# Patient Record
(demographics unavailable — no encounter records)

---

## 2024-10-25 NOTE — PHYSICAL EXAM

## 2024-10-25 NOTE — HISTORY OF PRESENT ILLNESS
[FreeTextEntry1] : Problems: 1. DM type 2  Note - patient has prostate cancer   DM type 2 1. Diagnosed in 2003 2. Meds: Insulin levemir pens 7 units sc bid Insulin humalog pens 7 units sc tid with meals - patient mainly uses this two times per day with breakfast and dinner - patient eats lunch but be does not use the insulin humalog as he eats a small amount with lunch.  Metformin 1000 mg po bid (no side effects) Ozempic 0.5 mg sc once weekly - no side effects - No pancreatitis, no personal or family history pancreatitis PATIENT HAD DIFFICULTY PAYING FOR [email or web address] Patient does not want to use actos due to the potential side effects. Patient has genital candidiasis so no SGLT-2 inhibitors 3. CGM - DEXCOM 7 - patient uses the reader - 60s to 200s, no hypoglycemic unawareness 4. On Aspirin 81 mg po daily, on pravastatin 20 mg po daily, on enalapril 2.5 mg po daily 5. Complications: No DM nephropathy (normal creatinine, neg urine microalbumin panel in July 2024) No DM retinopathy (last eye exam was in June 2023, patient advised on the need for annual DM eye exam) Has DM peripheral neuropathy - has pain in feet, on gabapentin 300 mg po bid No ASCVD No foot ulcers/amputation 6. Patient never smoked cigarettes

## 2024-10-25 NOTE — ASSESSMENT
[FreeTextEntry1] : Targets in patients 65 years and older: HbA1c 7 to 8%, BP < 140/90  HbA1c is above goal so I increased the dose of Ozempic - patient has hypoglycemia so I decreased the dose of insulin levemir and humalog BP is at goal.   The patient has painful DM peripheral neuropathy and is on gabapentin.  Patient is on Aspirin, statin and ACEi.  Last lipid panel - July 2024 - LDL 94, Trig 84 Last HbA1c - 07/28/2024 - 8.2% Last Vitamin B12 - None seen Last urine albumin panel - July 2024 - neg Last BMP/CMP - July 2024 - Cr, K, AST, ALT N   CGM INTERPRETATION DONE ON 02/27/2024   Plan: 1. Increase Ozempic to 1 mg sc once weekly 2. Decrease insulin levemir to 5 units sc bid 3. Decrease insulin humalog pens 5 units sc bid with breakfast and dinner (patient eats a small amount with lunch so he is not to use insulin humalog with lunch) 4. Labs to be done in 6 weeks - see below 5. CGM-  DEXCOM CGM 7 reader 6. Follow up in 6 weeks to review meter and results

## 2024-11-06 NOTE — PHYSICAL EXAM
[Normal Appearance] : normal appearance [Well Groomed] : well groomed [General Appearance - In No Acute Distress] : no acute distress [Edema] : no peripheral edema [Respiration, Rhythm And Depth] : normal respiratory rhythm and effort [Exaggerated Use Of Accessory Muscles For Inspiration] : no accessory muscle use [Abdomen Soft] : soft [Abdomen Tenderness] : non-tender [Costovertebral Angle Tenderness] : no ~M costovertebral angle tenderness [Normal Station and Gait] : the gait and station were normal for the patient's age [No Focal Deficits] : no focal deficits [] : no rash [Oriented To Time, Place, And Person] : oriented to person, place, and time [Affect] : the affect was normal [Mood] : the mood was normal [No Palpable Adenopathy] : no palpable adenopathy

## 2024-11-06 NOTE — ASSESSMENT
[FreeTextEntry1] : 1- check ct urogram 2- rtc for cysto  3- Elligard q 6m ( due Jan 2025) for total of 3 years as per dr watts note - began during salvage RT - 2023 4- Dr Watts notified

## 2024-11-06 NOTE — HISTORY OF PRESENT ILLNESS
[FreeTextEntry1] : July 2024 ( Dr watts)  07/29/2024: Mr. BROOKLYN ART presents today for an Eligard injection. He is accompanied by his wife. +SHx of radical prostatectomy in 2000. Had radiation for recurrence from 12/14/23 - 1/20/23. with Dr.Lucille Ray. The patient had requested numbing spray if we have as the injection is usually bothersome to him. I inquired with the medical assistants if we have any, however we do not. The patient was agreeable to still having the injection today despite not having any numbing preparation. I advised the patient to use a lidocaine/ salonpas patch prior to coming in for the next injection as this should locally numb the skin prior to injection. The patient continues to take oxybutynin ER 10 mg once daily. Requests refills. He denies any urinary urgency. Patient denies any constipation or dry mouth. Admits some pushing to initiate urination. No longer using trimix injections as he finds them to be painful.  Eligard 45 mg was administered subcutaneously by myself, Dr.Gary Watts. It was administered on the left side, at the level of the umbilicus. I advised the patient to take tylenol when he gets home so it is less painful. Discussed that we will continue hormone therapy for a total of 3 years.Patient will RTO in 6 months for next Eligard injection.  I informed the patient and his wife that if I reduce the strength some, he might not have to push as much to initiate urination. The patient was agreeable. I have written for oxybutynin ER 5 mg once daily. If he finds that he has urgency on this smaller dose, we can go back up to 10 mg.  here with wife  PSA < 0.01 and ua 23 red cells  patient here wth hematuria for few days and another time in Sept  patient on Oxy 10 - for INC , reduced to 5 due to hesitancy and urge - feels 10 better  good flow , feels empty after void , no constipatoin , admits to avg water

## 2024-12-18 NOTE — PROCEDURE
[FreeTextEntry1] : CGM interpretation: Device: DEXCOM  Period: Tue Dec 3, 2024 - Mon Dec 16, 2024 Findings:  Percent in range: 60 %, Percent low/very low BGs: 0 %, Percent high/very high BGs: 40 % Interpretation - BGs are at goal

## 2024-12-18 NOTE — HISTORY OF PRESENT ILLNESS
[FreeTextEntry1] : Problems: 1. DM type 2  Note - patient has prostate cancer   DM type 2 1. Diagnosed in 2003 2. Meds: Insulin levemir pens 5 units sc q am  - SWITCHED TO INSULIN BASAGLAR ON 12/18/2024 AS INSULIN LEVEMIR IS NO LONGER MANUFACTURED.  Insulin humalog pens 5 units sc with breakfast only - patient says he has hypoglycemia if he uses insulin humalog more than once per day Metformin 1000 mg po bid (no side effects) Ozempic 1 mg sc once weekly - has tolerable diarrhea with this - No pancreatitis, no personal or family history pancreatitis PATIENT HAD DIFFICULTY PAYING FOR [employer] Patient does not want to use actos due to the potential side effects. Patient has genital candidiasis so no SGLT-2 inhibitors 3. CGM - DEXCOM 7 - patient uses the reader - 80s to 250s, no hypoglycemic unawareness 4. Not on Aspirin, on pravastatin 20 mg po daily, on enalapril 2.5 mg po daily 5. Complications: No DM nephropathy (normal creatinine, neg urine microalbumin panel in July 2024) No DM retinopathy (last eye exam was in Sept 2024, patient advised on the need for annual DM eye exam) Has DM peripheral neuropathy - has pain in feet, on gabapentin 300 mg po bid No ASCVD No foot ulcers/amputation 6. Patient never smoked cigarettes

## 2024-12-18 NOTE — PHYSICAL EXAM

## 2024-12-18 NOTE — ASSESSMENT
[FreeTextEntry1] : Targets in patients 65 years and older: HbA1c 7 to 8%, BP < 140/90  HbA1c is at goal but patient has intermittent hyperglycemia so I increased the dose of Ozempic and I discontinued Insulin humalog. Patient to switch to insulin basaglar as this is no longer manufactured.  Patient wishes to continue using the DEXCOM so he needs to be on insulin at least once daily BP is at goal.   The patient has painful DM peripheral neuropathy and is on gabapentin.  Patient is on statin and ACEi. No indication for Aspirin  Last lipid panel - July 2024 - LDL 94, Trig 84 Last HbA1c - 12/09/2024 - 7.5% Last Vitamin B12 - Dec 2024 - N Last urine albumin panel - July 2024 - neg Last BMP/CMP - Dec 2024 - Cr, K, AST, ALT N   CGM INTERPRETATION DONE ON 12/18/2024   Plan: 1. Increase Ozempic to 2 mg sc once weekly 2. Discontinue insulin levemir 3. Start Insulin basaglar pens 5 units sc qam - patient may decrease this to 2 units sc qam if he has hypoglycemia.  Patient says he has a large supply of insulin basaglar at home.  4. Discontinue insulin humalog  5. Labs to be done in 3 months - see below 6. CGM -  DEXCOM CGM 7 reader 7. Follow up in 3 months to review meter and results

## 2025-01-29 NOTE — HISTORY OF PRESENT ILLNESS
[FreeTextEntry1] : Nov 2024 : CYSTO July 2024 ( Dr joseph) 07/29/2024: Mr. BROOKLYN ART presents today for an Eligard injection. He is accompanied by his wife. +SHx of radical prostatectomy in 2000. Had radiation for recurrence from 12/14/23 - 1/20/23. with Dr.Lucille Ray.. The patient continues to take oxybutynin ER 10 mg once daily. Eligard 45 mg was administered subcutaneously by myself, Dr.Gary NOBLES  CYSTO today for hematuria: normal bladder with clear efflux bilat, passable urethral stricures x 2 and 1- f/u on CT urogram done today 2- RTC as before for Jennifer.  CT SCAN ( Nov 26,2024) KIDNEYS/URETERS: No renal or ureteral calculi or hydronephrosis. Bilateral renal cysts, largest 3.7 cm in the left lower pole with thin calcified septation (Bosniak 2). Few small subcentimeter bilateral renal hypodense nodules too small to characterize. No calyceal or ureteral lesions are seen.  Now here for Eligard injectio  c.o some blood on tissue after void, denies strain , no strain wiht  BM , good flow, eating well, feels empty after void no urgency or INC  states feels cold always despite Iron supplement  wife concerned HCT not increasijng  however stable when reviwed -- since 2013 - last PSA- July 2024 < 0.1-- doesn't see RT anymore  creat stable  ELIGARD given by RAMO Brown ( see separtate note )

## 2025-01-29 NOTE — PHYSICAL EXAM
[Normal Appearance] : normal appearance [Well Groomed] : well groomed [General Appearance - In No Acute Distress] : no acute distress [Edema] : no peripheral edema [Respiration, Rhythm And Depth] : normal respiratory rhythm and effort [Exaggerated Use Of Accessory Muscles For Inspiration] : no accessory muscle use [Abdomen Soft] : soft [Abdomen Tenderness] : non-tender [Costovertebral Angle Tenderness] : no ~M costovertebral angle tenderness [Normal Station and Gait] : the gait and station were normal for the patient's age [] : no rash [No Focal Deficits] : no focal deficits [Oriented To Time, Place, And Person] : oriented to person, place, and time [Affect] : the affect was normal [Mood] : the mood was normal [No Palpable Adenopathy] : no palpable adenopathy [de-identified] : pvr- 40 ml

## 2025-01-29 NOTE — ASSESSMENT
[FreeTextEntry1] : 1- check urine 2- check psa and testosterone 3- rtc 6m for Eligard again - 45 mg-- LAST DOSE  June 2023)-Eligard 45 mg was administered subcutaneously by myself, Dr.Gary Watts. It was administered on the left side, at the level of the umbilicus. I advised the patient to take tylenol when he gets home so it is less painful. Discussed that we will continue hormone therapy for a total of 3 years.-- states first dose done 2023- by dr fabricio guillory 4- CHANTEL again annually for bosniak 2 cyst 5- offered to see Heme- ONC due to c/o feeling cold always despite FeSo4 supplement - Dr Milner

## 2025-03-18 NOTE — HISTORY OF PRESENT ILLNESS
[FreeTextEntry1] : Problems: 1. DM type 2  Note - patient has prostate cancer   DM type 2 1. Diagnosed in 2003 2. Meds: Insulin basaglar pens 10 units sc qam Metformin 1000 mg po bid (no side effects) Ozempic 2 mg sc once weekly - has tolerable diarrhea with this - No pancreatitis, no personal or family history pancreatitis PATIENT HAD DIFFICULTY PAYING FOR [employer] Patient does not want to use actos due to the potential side effects. Patient has genital candidiasis so no SGLT-2 inhibitors 3. CGM - DEXCOM 7 - patient uses the reader - 60s to 250s, no hypoglycemic unawareness 4. Not on Aspirin, on pravastatin 20 mg po daily, on enalapril 2.5 mg po daily 5. Complications: No DM nephropathy (normal creatinine, neg urine microalbumin panel in July 2024) No DM retinopathy (last eye exam was in Sept 2024, patient advised on the need for annual DM eye exam) Has DM peripheral neuropathy - has pain in feet was on gabapentin but no longer on this - pain is tolerable on no medications No ASCVD No foot ulcers/amputation 6. Patient never smoked cigarettes

## 2025-03-18 NOTE — PHYSICAL EXAM
[de-identified] : General: No distress, well nourished Eyes: Normal Sclera, EOMI, PERRL ENT: Normal appearance of the nose, normal oropharynx Neck/Thyroid: No cervical lymphadenopathy, thyroid gland 20 g in size, no thyroid nodules, non-tender Respiratory: No use of accessory muscles of respiration, vesicular breath sounds heard bilaterally, no crepitations or ronchi Cardiovascular: S1 and S2 heard and normal, no S3 or S4, no murmurs, radial pulse normal bilaterally Abdomen: soft, non-tender, no masses, normal bowel sounds Musculoskeletal: No swelling or deformities of joints of hands, no pedal edema Neurological: Normal range of motion in the hands, Normal brachioradialis reflexes bilaterally Psychiatry: Patient converses normally, good judgement and insight Skin: No rashes in hands, no nodules palpated in hands  [de-identified] : DM foot exam done on 03/18/2025: No ulcers seen in feet Dorsalis pedis pulses normal bilaterally Sensation to 10 g monofilament impaired in feet bilaterally

## 2025-03-18 NOTE — ASSESSMENT
[FreeTextEntry1] : Targets in patients 65 years and older: HbA1c 7 to 8%, BP < 140/90  HbA1c is at goal but patient has BGs in the 70s at night so I decreased the dose of insulin basaglar.  Patient wishes to continue using the DEXCOM so he needs to be on insulin at least once daily BP is at goal.   The patient has painful DM peripheral neuropathy and is on gabapentin.  Patient is on statin and ACEi. No indication for Aspirin  Last lipid panel - July 2024 - LDL 94, Trig 84 Last HbA1c - 03/17/2025 - 7.9% Last Vitamin B12 - Dec 2024 - N Last urine albumin panel - July 2024 - neg Last BMP/CMP - March 2025 - Cr, K, AST, ALT N   CGM INTERPRETATION DONE ON 03/18/2025   Plan: 1. Continue Ozempic 2 mg sc once weekly 2. Decrease Insulin basaglar pens to 6 units sc qam Patient says he has a large supply of insulin basaglar at home.  3. Labs to be done in 3 months - see below 4. CGM -  DEXCOM CGM 7 reader 5. Follow up in 3 months to review meter and results

## 2025-03-18 NOTE — PROCEDURE
[FreeTextEntry1] : CGM interpretation: Device: DEXCOM Period: March 5th 2025 to March 18th 2025 Findings:  Percent in range: 67 %, Percent low/very low BGs: 0 %, Percent high/very high BGs: 33 % Interpretation - BGs mainly at goal

## 2025-04-01 NOTE — REASON FOR VISIT
[Home] : at home, [unfilled] , at the time of the visit. [Other Location: e.g. Home (Enter Location, City,State)___] : at [unfilled] [Telehealth (audio & video)] : This visit was provided via telehealth using real-time 2-way audio visual technology. [Verbal consent obtained from patient] : the patient, [unfilled] [Initial Consultation] : an initial consultation for [FreeTextEntry2] : anemia

## 2025-04-01 NOTE — ASSESSMENT
[FreeTextEntry1] : cc: anemia  72. years old M with Pmhx. HDL, DMII,  HTN, neuropathy,  hand tremors,   Prostate ca dx back in. 2000 and txed with radical prostatectomy, recurrence 12/2023 - txed RT and now on Eligard injections treated by Dr Watts. Presents for an evaluation of anemia . He has episodes of hematuria and underwent cystoscopy-  and was told it was from irritation and no evidence of active bleeding.  Pt is taking Oral Iron on daily basi.  Has tingling sensation on his scalp and arms and legs but his main complaint is being cold all the time. as per spouse he has Bruising on groin and thigh area at times that  started about 3 months ago. denies cardiorespiratory distress.  Most recent Labs on 3/17/25 showed WBC  3.78 RBC 3.63 hgb 11.1 HCT 34.3 % MCV 94.5 PLT 307K monocytes 14.6% neutrophil # 1.66.  CMP - no renal insufficiency.    Problem # 1 Anemia   - to continue PO iron QD   - CBC, CMP for baseline - nutritional deficiency screen: B12/folate, - repeat bleeding screen: iron studies - hemolytic screen- LDH/hapto.retic -hgb electrophoresis and alpha Globin mutations  to r.o alpha or beta- thalassemia  - inflammatory markers- CRP/ESR   # neutropenia leukopenia  - R/o Lambert Null associated neutropenia- ABO/FyaFyb - CBC/CMP for baseline - Copper/B12/folate levels to screen for deficiency - HIV/Hepatitis B panel for viral screen - CRP/ESR for inflammatory screen - Flow cytometry   -Pt advised to go to local lab at a [facility] and once done to call us back to set up an f/u appointment 2-3 weeks after lab draw   to go over lab results via tele. Pt understands and agrees with plan. All questions answered to patients satisfaction.  - tasked ASA to email orders and [facility] locations to pt.

## 2025-04-01 NOTE — HISTORY OF PRESENT ILLNESS
[de-identified] : cc: anemia  72. years old M with Pmhx. HDL, DMII,  HTN, neuropathy,  hand tremors,   Prostate ca dx back in. 2000 and txed with radical prostatectomy, recurrence 12/2023 - txed RT and now on Eligard injections treated by Dr Watts. Presents for an evaluation of anemia . He has episodes of hematuria and underwent cystoscopy-  and was told it was from irritation and no evidence of active bleeding.  Pt is taking Oral Iron on daily basi.  Has tingling sensation on his scalp and arms and legs but his main complaint is being cold all the time. as per spouse he has Bruising on groin and thigh area at times that  started about 3 months ago. denies cardiorespiratory distress.  Most recent Labs on 3/17/25 showed WBC  3.78 RBC 3.63 hgb 11.1 HCT 34.3 % MCV 94.5 PLT 307K monocytes 14.6% neutrophil # 1.66.  CMP - no renal insufficiency.    Denies excessive or spontaneous bleeding or bruising. Denies  enlarged nodes. Denies  dysuria,  nocturia,  hesitancy,  urgency, oliguria,  hematuria, incontinence. Denies  nausea,  vomiting,  diarrhea,  Constipation,  heartburn, abdominal pain,  jaundice, melena, blood in stool. Denies  palpitations,  dyspnea, orthopnea, PND, Denies claudication,  edema,  varicose veins, Denies Fever, rigors,  diaphoresis.  Denies anorexia,  weight loss, sleep disturbance.  Denies resting dyspnea, exertional dyspnea, wheezing, cough,  stridor,  hemoptysis, Denies rash,  pruritus,  skin lesions, Denies bone pain,  Myalgia,  arthralgia,   joint swelling,  limited range of motion, Patient does not complain of frequent or severe infections. Patient does not complain of any allergic reactions.   PMHX: as above  Psx: rotator cuff repair  2018.  codeine, ibuprofen - itching  Meds: see list reconciled  Fam hx : M- hand tremors   F prostate ca  born in: [place]  Social hx : -  5 children,  business owner - real estate,  Denies TOB,  illicit drugs,  social ETOH.   HCM:  Dr Rd Alvarez  -sees PCP once a year Dr Watts urologist,  Dr Frost- endo Dr Vang - neurologist.  Dr rd Mays- ortho  colonoscopy/EGD : colonoscopy 1/20[facility] Dr Gipson -  all ok,  EGD -  cannot recall date  COVID vax: x. 4  COVID infection:  yes

## 2025-05-08 NOTE — ASSESSMENT
[FreeTextEntry1] : cc: anemia 72. years old M with Pmhx. HDL, DMII, HTN, neuropathy, hand tremors, Prostate ca dx back in. 2000 and txed with radical prostatectomy, recurrence 12/2023 - txed RT and now on Eligard injections treated by Dr Watts. Presents for an evaluation of anemia. He has episodes of hematuria and underwent cystoscopy- and was told it was from irritation and no evidence of active bleeding. Pt is taking Oral Iron on daily basi. Has tingling sensation on his scalp and arms and legs but his main complaint is being cold all the time. as per spouse he has Bruising on groin and thigh area at times that started about 3 months ago. denies cardiorespiratory distress. Most recent Labs on 3/17/25 showed WBC 3.78 RBC 3.63 hgb 11.1 HCT 34.3 % MCV 94.5 PLT 307K monocytes 14.6% neutrophil # 1.66. CMP - no renal insufficiency.   Problem # 1 Anemia - to continue PO iron QD  - CBC, CMP for baseline - nutritional deficiency screen: B12/folate, - repeat bleeding screen: iron studies - hemolytic screen- LDH/hapto.retic -hgb electrophoresis and alpha Globin mutations to r.o alpha or beta- thalassemia - inflammatory markers- CRP/ESR  # neutropenia leukopenia - R/o Lambert Null associated neutropenia- ABO/FyaFyb - CBC/CMP for baseline - Copper/B12/folate levels to screen for deficiency - HIV/Hepatitis B panel for viral screen - CRP/ESR for inflammatory screen - Flow cytometry  -Pt advised to go to local lab at a [facility] and once done to call us back to set up an f/u appointment 2-3 weeks after lab draw to go over lab results via tele. Pt understands and agrees with plan. All questions answered to patients satisfaction. - tasked ASA to email orders and [facility] locations to pt.  5/8/25  Patient was seen via telemedicine today to review results from his anemia workup. He reports feeling well with no changes in symptoms since his last visit. Labs were reviewed in detail and revealed a normal ANETTE, ESR, CRP, rheumatoid factor, HIV, hepatitis panel, and flow cytometry. CMP was within normal limits, with no signs of renal insufficiency. Findings are consistent with mild, borderline iron deficiency anemia. Patient was advised to undergo GI evaluation--specifically an endoscopy and capsule studyto rule out occult gastrointestinal bleeding. His daughter was present during the telemedicine visit and is aware of the plan. Ferrous sulfate 325 mg daily was prescribed along with Colace, and repeat iron studies will be obtained in five weeks.

## 2025-05-08 NOTE — ASSESSMENT
[FreeTextEntry1] : cc: anemia 72. years old M with Pmhx. HDL, DMII, HTN, neuropathy, hand tremors, Prostate ca dx back in. 2000 and txed with radical prostatectomy, recurrence 12/2023 - txed RT and now on Eligard injections treated by Dr Watts. Presents for an evaluation of anemia. He has episodes of hematuria and underwent cystoscopy- and was told it was from irritation and no evidence of active bleeding. Pt is taking Oral Iron on daily basi. Has tingling sensation on his scalp and arms and legs but his main complaint is being cold all the time. as per spouse he has Bruising on groin and thigh area at times that started about 3 months ago. denies cardiorespiratory distress. Most recent Labs on 3/17/25 showed WBC 3.78 RBC 3.63 hgb 11.1 HCT 34.3 % MCV 94.5 PLT 307K monocytes 14.6% neutrophil # 1.66. CMP - no renal insufficiency.   Problem # 1 Anemia - to continue PO iron QD  - CBC, CMP for baseline - nutritional deficiency screen: B12/folate, - repeat bleeding screen: iron studies - hemolytic screen- LDH/hapto.retic -hgb electrophoresis and alpha Globin mutations to r.o alpha or beta- thalassemia - inflammatory markers- CRP/ESR  # neutropenia leukopenia - R/o Lambert Null associated neutropenia- ABO/FyaFyb - CBC/CMP for baseline - Copper/B12/folate levels to screen for deficiency - HIV/Hepatitis B panel for viral screen - CRP/ESR for inflammatory screen - Flow cytometry  -Pt advised to go to local lab at a [facility] and once done to call us back to set up an f/u appointment 2-3 weeks after lab draw to go over lab results via tele. Pt understands and agrees with plan. All questions answered to patients satisfaction. - tasked ASA to email orders and [facility] locations to pt.  5/8/25  Patient was seen via telemedicine today to review results from his anemia workup. He reports feeling well with no changes in symptoms since his last visit. Labs were reviewed in detail and revealed a normal ANETTE, ESR, CRP, rheumatoid factor, HIV, hepatitis panel, and flow cytometry. CMP was within normal limits, with no signs of renal insufficiency. Findings are consistent with mild, borderline iron deficiency anemia. Patient was advised to undergo GI evaluation--specifically an endoscopy and capsule studyto rule out occult gastrointestinal bleeding. His daughter was present during the telemedicine visit and is aware of the plan. Ferrous sulfate 325 mg daily was prescribed along with Colace, and repeat iron studies will be obtained in five weeks.

## 2025-05-08 NOTE — HISTORY OF PRESENT ILLNESS
[de-identified] : cc: anemia 72. years old M with Pmhx. HDL, DMII, HTN, neuropathy, hand tremors, Prostate ca dx back in. 2000 and txed with radical prostatectomy, recurrence 12/2023 - txed RT and now on Eligard injections treated by Dr Watts. Presents for an evaluation of anemia. He has episodes of hematuria and underwent cystoscopy- and was told it was from irritation and no evidence of active bleeding. Pt is taking Oral Iron on daily basi. Has tingling sensation on his scalp and arms and legs but his main complaint is being cold all the time. as per spouse he has Bruising on groin and thigh area at times that started about 3 months ago. denies cardiorespiratory distress. Most recent Labs on 3/17/25 showed WBC 3.78 RBC 3.63 hgb 11.1 HCT 34.3 % MCV 94.5 PLT 307K monocytes 14.6% neutrophil # 1.66. CMP - no renal insufficiency.  Denies excessive or spontaneous bleeding or bruising. Denies enlarged nodes. Denies dysuria, nocturia, hesitancy, urgency, oliguria, hematuria, incontinence. Denies nausea, vomiting, diarrhea, Constipation, heartburn, abdominal pain, jaundice, melena, blood in stool. Denies palpitations, dyspnea, orthopnea, PND, Denies claudication, edema, varicose veins, Denies Fever, rigors, diaphoresis. Denies anorexia, weight loss, sleep disturbance. Denies resting dyspnea, exertional dyspnea, wheezing, cough, stridor, hemoptysis, Denies rash, pruritus, skin lesions, Denies bone pain, Myalgia, arthralgia, joint swelling, limited range of motion, Patient does not complain of frequent or severe infections. Patient does not complain of any allergic reactions.  PMHX: as above Psx: rotator cuff repair 2018. 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 HCM: Dr Rd Alvarez -sees PCP once a year Dr Watts urologist, Dr Frost- endo Dr Vang - neurologist. Dr rd Mays- ortho colonoscopy/EGD : colonoscopy 1/[facility] Dr Gipson - all ok, EGD - cannot recall date COVID vax: x. 4 COVID infection: yes.     [de-identified] : 5/8/25Patient was seen via telemedicine today to review results from his anemia workup. He reports feeling well with no changes in symptoms since his last visit. Labs were reviewed in detail and revealed a normal ANETTE, ESR, CRP, rheumatoid factor, HIV, hepatitis panel, and flow cytometry. CMP was within normal limits, with no signs of renal insufficiency. Findings are consistent with mild, borderline iron deficiency anemia. Patient was advised to undergo GI evaluation--specifically an endoscopy and capsule studyto rule out occult gastrointestinal bleeding. His daughter was present during the telemedicine visit and is aware of the plan. Ferrous sulfate 325 mg daily was prescribed along with Colace, and repeat iron studies will be obtained in five weeks.

## 2025-05-08 NOTE — REASON FOR VISIT
[Family Member] : family member [Other:____] : [unfilled] [Home] : at home, [unfilled] , at the time of the visit. [Other Location: e.g. Home (Enter Location, City,State)___] : at [unfilled] [Telehealth (audio & video)] : This visit was provided via telehealth using real-time 2-way audio visual technology. [Verbal consent obtained from patient] : the patient, [unfilled] [Follow-Up Visit] : a follow-up visit for

## 2025-05-08 NOTE — HISTORY OF PRESENT ILLNESS
[de-identified] : cc: anemia 72. years old M with Pmhx. HDL, DMII, HTN, neuropathy, hand tremors, Prostate ca dx back in. 2000 and txed with radical prostatectomy, recurrence 12/2023 - txed RT and now on Eligard injections treated by Dr Watts. Presents for an evaluation of anemia. He has episodes of hematuria and underwent cystoscopy- and was told it was from irritation and no evidence of active bleeding. Pt is taking Oral Iron on daily basi. Has tingling sensation on his scalp and arms and legs but his main complaint is being cold all the time. as per spouse he has Bruising on groin and thigh area at times that started about 3 months ago. denies cardiorespiratory distress. Most recent Labs on 3/17/25 showed WBC 3.78 RBC 3.63 hgb 11.1 HCT 34.3 % MCV 94.5 PLT 307K monocytes 14.6% neutrophil # 1.66. CMP - no renal insufficiency.  Denies excessive or spontaneous bleeding or bruising. Denies enlarged nodes. Denies dysuria, nocturia, hesitancy, urgency, oliguria, hematuria, incontinence. Denies nausea, vomiting, diarrhea, Constipation, heartburn, abdominal pain, jaundice, melena, blood in stool. Denies palpitations, dyspnea, orthopnea, PND, Denies claudication, edema, varicose veins, Denies Fever, rigors, diaphoresis. Denies anorexia, weight loss, sleep disturbance. Denies resting dyspnea, exertional dyspnea, wheezing, cough, stridor, hemoptysis, Denies rash, pruritus, skin lesions, Denies bone pain, Myalgia, arthralgia, joint swelling, limited range of motion, Patient does not complain of frequent or severe infections. Patient does not complain of any allergic reactions.  PMHX: as above Psx: rotator cuff repair 2018. 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 HCM: Dr Rd Alvarez -sees PCP once a year Dr Watts urologist, Dr Frost- endo Dr Vang - neurologist. Dr rd Mays- ortho colonoscopy/EGD : colonoscopy 1/[facility] Dr Gipson - all ok, EGD - cannot recall date COVID vax: x. 4 COVID infection: yes.     [de-identified] : 5/8/25Patient was seen via telemedicine today to review results from his anemia workup. He reports feeling well with no changes in symptoms since his last visit. Labs were reviewed in detail and revealed a normal ANETTE, ESR, CRP, rheumatoid factor, HIV, hepatitis panel, and flow cytometry. CMP was within normal limits, with no signs of renal insufficiency. Findings are consistent with mild, borderline iron deficiency anemia. Patient was advised to undergo GI evaluation--specifically an endoscopy and capsule studyto rule out occult gastrointestinal bleeding. His daughter was present during the telemedicine visit and is aware of the plan. Ferrous sulfate 325 mg daily was prescribed along with Colace, and repeat iron studies will be obtained in five weeks.